# Patient Record
Sex: MALE | Race: WHITE | NOT HISPANIC OR LATINO | Employment: OTHER
[De-identification: names, ages, dates, MRNs, and addresses within clinical notes are randomized per-mention and may not be internally consistent; named-entity substitution may affect disease eponyms.]

---

## 2017-02-10 ENCOUNTER — RECORDS - HEALTHEAST (OUTPATIENT)
Dept: ADMINISTRATIVE | Facility: OTHER | Age: 57
End: 2017-02-10

## 2017-05-22 ENCOUNTER — RECORDS - HEALTHEAST (OUTPATIENT)
Dept: ADMINISTRATIVE | Facility: OTHER | Age: 57
End: 2017-05-22

## 2017-08-14 ENCOUNTER — RECORDS - HEALTHEAST (OUTPATIENT)
Dept: ADMINISTRATIVE | Facility: OTHER | Age: 57
End: 2017-08-14

## 2017-11-16 ENCOUNTER — RECORDS - HEALTHEAST (OUTPATIENT)
Dept: ADMINISTRATIVE | Facility: OTHER | Age: 57
End: 2017-11-16

## 2018-02-15 ENCOUNTER — RECORDS - HEALTHEAST (OUTPATIENT)
Dept: ADMINISTRATIVE | Facility: OTHER | Age: 58
End: 2018-02-15

## 2018-05-10 ENCOUNTER — RECORDS - HEALTHEAST (OUTPATIENT)
Dept: ADMINISTRATIVE | Facility: OTHER | Age: 58
End: 2018-05-10

## 2018-06-01 ENCOUNTER — RECORDS - HEALTHEAST (OUTPATIENT)
Dept: ADMINISTRATIVE | Facility: OTHER | Age: 58
End: 2018-06-01

## 2018-12-11 ENCOUNTER — RECORDS - HEALTHEAST (OUTPATIENT)
Dept: ADMINISTRATIVE | Facility: OTHER | Age: 58
End: 2018-12-11

## 2019-07-11 ENCOUNTER — RECORDS - HEALTHEAST (OUTPATIENT)
Dept: ADMINISTRATIVE | Facility: OTHER | Age: 59
End: 2019-07-11

## 2019-07-18 ENCOUNTER — RECORDS - HEALTHEAST (OUTPATIENT)
Dept: ADMINISTRATIVE | Facility: OTHER | Age: 59
End: 2019-07-18

## 2019-11-08 ENCOUNTER — COMMUNICATION - HEALTHEAST (OUTPATIENT)
Dept: TELEHEALTH | Facility: CLINIC | Age: 59
End: 2019-11-08

## 2019-11-08 ENCOUNTER — OFFICE VISIT - HEALTHEAST (OUTPATIENT)
Dept: INTERNAL MEDICINE | Facility: CLINIC | Age: 59
End: 2019-11-08

## 2019-11-08 DIAGNOSIS — Z85.46 HISTORY OF PROSTATE CANCER: ICD-10-CM

## 2019-11-08 DIAGNOSIS — Z00.00 HEALTHCARE MAINTENANCE: ICD-10-CM

## 2019-11-08 DIAGNOSIS — Z12.11 SCREEN FOR COLON CANCER: ICD-10-CM

## 2019-11-08 DIAGNOSIS — Z83.3 FAMILY HISTORY OF DIABETES MELLITUS IN FATHER: ICD-10-CM

## 2019-11-08 LAB
ALBUMIN SERPL-MCNC: 4.2 G/DL (ref 3.5–5)
ALP SERPL-CCNC: 90 U/L (ref 45–120)
ALT SERPL W P-5'-P-CCNC: 42 U/L (ref 0–45)
ANION GAP SERPL CALCULATED.3IONS-SCNC: 12 MMOL/L (ref 5–18)
AST SERPL W P-5'-P-CCNC: 36 U/L (ref 0–40)
BILIRUB SERPL-MCNC: 0.6 MG/DL (ref 0–1)
BUN SERPL-MCNC: 16 MG/DL (ref 8–22)
CALCIUM SERPL-MCNC: 9.6 MG/DL (ref 8.5–10.5)
CHLORIDE BLD-SCNC: 104 MMOL/L (ref 98–107)
CHOLEST SERPL-MCNC: 176 MG/DL
CO2 SERPL-SCNC: 23 MMOL/L (ref 22–31)
CREAT SERPL-MCNC: 1.1 MG/DL (ref 0.7–1.3)
ERYTHROCYTE [DISTWIDTH] IN BLOOD BY AUTOMATED COUNT: 12.2 % (ref 11–14.5)
FASTING STATUS PATIENT QL REPORTED: YES
GFR SERPL CREATININE-BSD FRML MDRD: >60 ML/MIN/1.73M2
GLUCOSE BLD-MCNC: 100 MG/DL (ref 70–125)
HCT VFR BLD AUTO: 51.2 % (ref 40–54)
HDLC SERPL-MCNC: 61 MG/DL
HGB BLD-MCNC: 17.4 G/DL (ref 14–18)
LDLC SERPL CALC-MCNC: 95 MG/DL
MCH RBC QN AUTO: 34.1 PG (ref 27–34)
MCHC RBC AUTO-ENTMCNC: 34 G/DL (ref 32–36)
MCV RBC AUTO: 101 FL (ref 80–100)
PLATELET # BLD AUTO: 202 THOU/UL (ref 140–440)
PMV BLD AUTO: 7.8 FL (ref 7–10)
POTASSIUM BLD-SCNC: 4.7 MMOL/L (ref 3.5–5)
PROT SERPL-MCNC: 7.1 G/DL (ref 6–8)
RBC # BLD AUTO: 5.09 MILL/UL (ref 4.4–6.2)
SODIUM SERPL-SCNC: 139 MMOL/L (ref 136–145)
TRIGL SERPL-MCNC: 99 MG/DL
WBC: 4.8 THOU/UL (ref 4–11)

## 2019-11-08 ASSESSMENT — MIFFLIN-ST. JEOR: SCORE: 1995.13

## 2019-11-11 ENCOUNTER — COMMUNICATION - HEALTHEAST (OUTPATIENT)
Dept: INTERNAL MEDICINE | Facility: CLINIC | Age: 59
End: 2019-11-11

## 2020-01-17 ENCOUNTER — RECORDS - HEALTHEAST (OUTPATIENT)
Dept: ADMINISTRATIVE | Facility: OTHER | Age: 60
End: 2020-01-17

## 2020-01-23 ENCOUNTER — RECORDS - HEALTHEAST (OUTPATIENT)
Dept: ADMINISTRATIVE | Facility: OTHER | Age: 60
End: 2020-01-23

## 2020-01-24 ENCOUNTER — RECORDS - HEALTHEAST (OUTPATIENT)
Dept: ADMINISTRATIVE | Facility: OTHER | Age: 60
End: 2020-01-24

## 2020-02-26 ENCOUNTER — HOSPITAL ENCOUNTER (OUTPATIENT)
Dept: CT IMAGING | Facility: HOSPITAL | Age: 60
Discharge: HOME | End: 2020-02-26

## 2020-02-26 DIAGNOSIS — D12.4 BENIGN NEOPLASM OF DESCENDING COLON: ICD-10-CM

## 2020-02-26 DIAGNOSIS — K57.92 DIVERTICULITIS: ICD-10-CM

## 2020-02-26 DIAGNOSIS — Z12.11 SCREENING FOR MALIGNANT NEOPLASM OF COLON: ICD-10-CM

## 2020-02-26 DIAGNOSIS — D12.3 BENIGN NEOPLASM OF TRANSVERSE COLON: ICD-10-CM

## 2020-02-26 DIAGNOSIS — K63.5 POLYP OF COLON, UNSPECIFIED PART OF COLON, UNSPECIFIED TYPE: ICD-10-CM

## 2020-02-26 DIAGNOSIS — K64.8 HEMORRHOIDS, INTERNAL: ICD-10-CM

## 2020-02-26 LAB
CREAT BLD-MCNC: 1.1 MG/DL (ref 0.7–1.3)
GFR SERPL CREATININE-BSD FRML MDRD: >60 ML/MIN/1.73M2

## 2020-06-22 ENCOUNTER — COMMUNICATION - HEALTHEAST (OUTPATIENT)
Dept: SCHEDULING | Facility: CLINIC | Age: 60
End: 2020-06-22

## 2020-08-31 ENCOUNTER — COMMUNICATION - HEALTHEAST (OUTPATIENT)
Dept: TELEHEALTH | Facility: CLINIC | Age: 60
End: 2020-08-31

## 2020-08-31 ENCOUNTER — OFFICE VISIT - HEALTHEAST (OUTPATIENT)
Dept: INTERNAL MEDICINE | Facility: CLINIC | Age: 60
End: 2020-08-31

## 2020-08-31 DIAGNOSIS — Z87.19 HISTORY OF FATTY INFILTRATION OF LIVER: ICD-10-CM

## 2020-08-31 DIAGNOSIS — R07.9 CHEST PAIN, UNSPECIFIED TYPE: ICD-10-CM

## 2020-08-31 DIAGNOSIS — Z85.46 HISTORY OF PROSTATE CANCER: ICD-10-CM

## 2020-08-31 LAB
ATRIAL RATE - MUSE: 84 BPM
DIASTOLIC BLOOD PRESSURE - MUSE: NORMAL
INTERPRETATION ECG - MUSE: NORMAL
P AXIS - MUSE: 61 DEGREES
PR INTERVAL - MUSE: 156 MS
QRS DURATION - MUSE: 86 MS
QT - MUSE: 426 MS
QTC - MUSE: 503 MS
R AXIS - MUSE: 89 DEGREES
SYSTOLIC BLOOD PRESSURE - MUSE: NORMAL
T AXIS - MUSE: 59 DEGREES
VENTRICULAR RATE- MUSE: 84 BPM

## 2020-08-31 RX ORDER — IBUPROFEN 200 MG
200 TABLET ORAL EVERY 6 HOURS PRN
Status: SHIPPED | COMMUNITY
Start: 2020-08-31

## 2020-08-31 RX ORDER — ASPIRIN 325 MG
325 TABLET ORAL EVERY 6 HOURS PRN
Status: SHIPPED | COMMUNITY
Start: 2020-08-31 | End: 2022-08-15

## 2021-06-03 VITALS
HEART RATE: 68 BPM | DIASTOLIC BLOOD PRESSURE: 83 MMHG | WEIGHT: 251.6 LBS | BODY MASS INDEX: 33.34 KG/M2 | HEIGHT: 73 IN | SYSTOLIC BLOOD PRESSURE: 128 MMHG

## 2021-06-03 NOTE — PATIENT INSTRUCTIONS - HE
Check blood pressure.  Make sure your blood pressure is running less than 135/85.  If it is running higher than that, contact me or follow-up in clinic to evaluate.    Continue regular walking exercise.  Goal for 20 minutes of exercise 3-4 times a week or more.    Reduce simple carbohydrates and work on weight loss.    You could consider the Shingrix shingles vaccine.  Check with your insurance.    I would recommend a flu shot this fall.    [place] [facility] will contact you to set up colonoscopy.    Routine physical follow-up in 1 year.

## 2021-06-03 NOTE — PROGRESS NOTES
[facility] clinic Follow Up Note    Zachary Barajas   59 y.o. male    Date of Visit: 11/8/2019    Chief Complaint   Patient presents with     Annual Exam     Subjective  Zachary, brother-in-law of Eddie Beckham, he is here for health maintenance physical exam.  I have not seen him since 2015, just before he was diagnosed with prostate cancer and had a prostatectomy.    December 2015 pathology with a 3+3 prostate cancer.  Negative margins negative nodes.  I did review the urology note from July 2019 with negative exam and PSA less than 0.1.  Minimal incontinence just 1 pad a day.  Stable nocturia.    He did have a family history of prostate cancer with his father.    He also has a family history of diabetes with his father.  No family history of coronary disease except for grandfather.    Patient's blood sugars have been normal.  In 2015 his LDL 80 and HDL 79 without medication.  Blood sugar was normal at that time.    He is moderately overweight.  Just rare beer.  He does not drink pop.  He does eat some snack food and large meals at times.  He generally eats a healthy diet.    His blood pressure has been normal in the past.  He does not check blood pressure on his own.  He denies any recent salty foods.    He does take occasional ibuprofen but not regularly.  He has some mild knee DJD achiness when doing stairs or on his feet more.  But no flare currently.    He has an old left ankle injury from basketball, with chronic ankle pain.    History of chronic sinusitis but no flare currently.  Not needing Flonase or Claritin currently.    He is never smoked.  No new cough.    No chest pain or chest pressure or palpitations.    He does walk about 3 times a week in his neighborhood about 20 minutes.  Good exertional ability.  No falls.    Bowels are normal and no abdominal pain.  Occasional bright red blood per rectum but not within the last year.  December 2004 colonoscopy negative.  No family history of colon  "cancer.    No headache complaints.  No vision changes.  Did see the ophthalmologist earlier this year and no problems.  No mouth sores or swallowing difficulty.    He has not noticed any change in his moles.  No family history of melanoma.    Sleeps adequately at night without sniffing and daytime sleepiness.    PMHx:    Past Medical History:   Diagnosis Date     Cancer (H)     prostate     PSHx:    Past Surgical History:   Procedure Laterality Date     oral extractions      in childhood     MO LAP,PROSTATECTOMY,RADICAL,W/NERVE SPARE,INCL ROBOTIC Bilateral 12/22/2015    Procedure: DAVINCI RADICAL RETROPUBIC PROSTATECTOMY BILATERAL PELVIC LYMPH NODE DISSECTION;  Surgeon: Junaid Carcamo MD;  Location: [facility] [place];  Service: Urology     Immunizations:   Immunization History   Administered Date(s) Administered     Td,adult,historic,unspecified 12/02/2004     Tdap 07/16/2015       ROS A comprehensive review of systems was performed and was otherwise negative    Medications, allergies, and problem list were reviewed and updated    Exam  /88 (Patient Site: Right Arm, Patient Position: Sitting, Cuff Size: Adult Large)   Pulse 68   Ht 6' 1\" (1.854 m)   Wt (!) 251 lb 9.6 oz (114.1 kg)   BMI 33.19 kg/m    Alert and oriented x3 with good mood and affect.  Pupils and irises equal and reactive.  Extra ocular muscles intact.  No jaundice or conjunctivitis.  External ears and nose exam is normal with tympanic membranes normal.  Pharynx is not crowded.  Teeth in good condition.  No cervical or supraclavicular adenopathy.  No JVD and no carotid bruits.  No thyromegaly or nodularity.  Lungs are clear to auscultation with normal respiratory excursion.  Heart is regular with no murmur rub or gallop.  He has +1 pedal pulses.  No ankle edema but moderate varicose veins.  Feet in good condition.  Abdomen is mildly overweight.  No hepatospleno megaly or pulsatile mass.  Abdomen nontender.  No  exam as has been " done by his urologist.  Skin exam shows multiple well demarcated uniform color moles.  Gait normal.  Muscular skeletal exam otherwise normal.    Blood pressure rechecked by me was 128/83    Assessment/Plan  1. Healthcare maintenance  Main emphasis is on reducing progression to diabetes.  He otherwise appears relatively low cardiovascular risk profile.  He is never smoked and has excellent cholesterol levels with high HDL.    Blood pressure has been normal in the past, but I did ask him to check blood pressure on his own and get back to me if it is running high.    I did emphasize the importance of losing weight and improving regular exercise.    I discussed lower carbohydrate diet.  - Comprehensive Metabolic Panel  - HM2(CBC w/o Differential)  - Lipid Cascade    2. Screen for colon cancer  Proceed with colonoscopy  - Ambulatory referral for Colonoscopy    3. History of prostate cancer  I would not anticipate recurrence.  Low-grade prostate cancer with negative margins and nodes.  Follow-up with urology as planned.  After he completes 5 years of surveillance with urology, he could consider simply checking PSAs yearly here.    4. Family history of diabetes mellitus in father  Diet and exercise plan discussed    He declined the flu shot but I encouraged him to get it.  See patient instructions.    Return in about 1 year (around 11/8/2020) for Annual physical.   Patient Instructions   Check blood pressure.  Make sure your blood pressure is running less than 135/85.  If it is running higher than that, contact me or follow-up in clinic to evaluate.    Continue regular walking exercise.  Goal for 20 minutes of exercise 3-4 times a week or more.    Reduce simple carbohydrates and work on weight loss.    You could consider the Shingrix shingles vaccine.  Check with your insurance.    I would recommend a flu shot this fall.    [place] [facility] will contact you to set up colonoscopy.    Routine physical follow-up in  1 year.    Lino Castro MD        No current outpatient medications on file.     No current facility-administered medications for this visit.      No Known Allergies  Social History     Tobacco Use     Smoking status: Never Smoker     Smokeless tobacco: Never Used   Substance Use Topics     Alcohol use: Yes     Alcohol/week: 4.0 - 6.0 standard drinks     Types: 4 - 6 Standard drinks or equivalent per week     Comment: occasional     Drug use: No

## 2021-06-04 VITALS
WEIGHT: 243.5 LBS | HEART RATE: 80 BPM | DIASTOLIC BLOOD PRESSURE: 84 MMHG | SYSTOLIC BLOOD PRESSURE: 122 MMHG | TEMPERATURE: 98.9 F | BODY MASS INDEX: 32.13 KG/M2

## 2021-06-09 NOTE — TELEPHONE ENCOUNTER
Wife informed. Offered an appt today. Wife stated pt is doing well but will have him let us know if he changes his mind. Wife stated that if pt has chest tightness or shortness of breath he will go to the ER asap.

## 2021-06-09 NOTE — TELEPHONE ENCOUNTER
"Patient's wife Alivia calls on his behalf today. Patient came on the line briefly to give verbal consent to speak with Alivia. He did not sound short of breath or in distress at that time. Breathing comfortably, states he is currently working from home.      Alivia states that patient has some chest tightness and shortness of breath today. He's had the intermittent shortness of breath for about 2 weeks. She states \"he breathes so hard he sounds like darth mily.\" Today the shortness of breath is a little bit more than usual. The new concerning symptom today is the consistent chest tightness that radiates to  the shoulder. Alivia states \"it's not an emergency or anything. But I'd like him to just come in for an appointment to be seen.\" Denies numbness/tingling, denies dizziness or fevers.    I advised Alivia that the symptoms patient is having right now are in fact emergent symptoms. Any kind of chest pain/tightness that lasts longer than a few minutes is serious. Especially if patient feels the pain radiating to the shoulder and has some shortness of breath. These are very serious symptoms that warrant immediate attention. We cannot safely rule out a heart attack or other cardiac symptom over the phone. Patient should be taken into the ED immediately. Alivia verbalizes understanding of the disposition but declines at this time. She states she just wants an office visit. I told her I can try to get an office visit today but even that might be tough as it's already 3pm. Transferred to scheduling who confirmed no office visits today. Checked to see if patient would be willing to be seen in walk-in urgent care but declines. States they would like to watch his symptoms overnight and will call back tomorrow to make an appointment if still concerned. She states she is prepared to call 911/EMS if his symptoms become worse.     I will forward this to patient's PCP or covering provider today to make sure they are aware of this " situation.    Lakeshia Steele RN    Reason for Disposition    Chest pain lasting longer than 5 minutes and ANY of the following:* Over 50 years old* Over 30 years old and at least one cardiac risk factor (i.e., high blood pressure, diabetes, high cholesterol, obesity, smoker or strong family history of heart disease)* Pain is crushing, pressure-like, or heavy * Took nitroglycerin and chest pain was not relieved* History of heart disease (i.e., angina, heart attack, bypass surgery, angioplasty, CHF)    Additional Information    Negative: Followed an injury to chest    Negative: Sounds like a life-threatening emergency to the triager    Negative: Visible sweat on face or sweat dripping down face    Negative: Passed out (i.e., fainted, collapsed and was not responding)    Negative: SEVERE difficulty breathing (e.g., struggling for each breath, speaks in single words)    Chest pain lasting longer than 5 minutes    Protocols used: CHEST PAIN-A-OH

## 2021-06-09 NOTE — TELEPHONE ENCOUNTER
This does not appear to have been forwarded to covering physician last week.  I was out of office last week.     Patient needs immediate assessment, but apparently refused on June 22. If patient continues to have symptoms of chest tightness or shortness of breath, he should go to ER right away.  Make sure he also gets a clinic appointment with available provider on Monday.

## 2021-06-11 NOTE — PROGRESS NOTES
[facility] clinic Follow Up Note    Zachary Barajas   59 y.o. male    Date of Visit: 8/31/2020    Chief Complaint   Patient presents with     Follow-up     Blood pressure checkup     Subjective  Zachary is here to evaluate 2 episodes of chest pain that occurred in March and then in June.    In March he was working very hard to get a large cat climbing equipment put up, and thinks he may have strained a muscle.  He did not feel that until the next day when he got up and felt a linear tight muscle feeling, like a pulled muscle, underneath his left pectoral area at approximately T6 on the left.  There is no rash or vesicular eruption.  He took some ibuprofen or aspirin and it lasted about 30 minutes with resolution.  He proceeded to continue his normal activity.    He denies any shortness of breath or diaphoresis or nausea or abdominal pain at that time.  Pain began at rest.  Pain was not exacerbated by activity.  No new cough or fever.    Patient had a similar episode in June, also at rest after sitting in a chair on his computer for prolonged period of time.  He is now got a better more comfortable chair.  That occurred while sitting.  He stated he got up and stretched and moved around and within 30 minutes that also resolved in June.    He is not had any recurrence of the chest pain.    He is active with walking in the neighborhood about 3 times a week.  Plays 18 holes of golf.  He was shoveling dirt and moving some stones last week but did not have any of the above symptoms with that.    He is moderately overweight.  He has a family history of diabetes with his father but his blood sugars have been normal.    Patient has never smoked.    Blood pressure has been normal, not on medication.  Some borderline blood pressures last year but normal today.    November 2019 LDL 95 and HDL 61 without medication.  No family history of coronary artery disease.    He denies he is having heartburn.    No fever or  swallowing problems.  No throat pain.    Past history of prostatectomy in 2015 for 3+3 prostate cancer with negative margins.  I did review labs from January 2020 with a PSA less than 0.1.    I did review an abdominal CT scan report that was done by gastroenterology in February of this year.  Fatty liver described.  Left kidney cyst but otherwise negative.    His colonoscopy did show 3 tubular adenomas January 2020.  An ileal biopsy was negative.  No family history of colon cancer.    Bowels are normal and no abdominal pain complaints.    He denies flare of his sinusitis currently.    PMHx:    Past Medical History:   Diagnosis Date     Cancer (H)     prostate     PSHx:    Past Surgical History:   Procedure Laterality Date     oral extractions      in childhood     VT LAP,PROSTATECTOMY,RADICAL,W/NERVE SPARE,INCL ROBOTIC Bilateral 12/22/2015    Procedure: DAVINCI RADICAL RETROPUBIC PROSTATECTOMY BILATERAL PELVIC LYMPH NODE DISSECTION;  Surgeon: Junaid Carcamo MD;  Location: [facility] [place];  Service: Urology     Immunizations:   Immunization History   Administered Date(s) Administered     Td,adult,historic,unspecified 12/02/2004     Tdap 07/16/2015       ROS A comprehensive review of systems was performed and was otherwise negative    Medications, allergies, and problem list were reviewed and updated    Exam  /84 (Patient Site: Right Arm, Patient Position: Sitting, Cuff Size: Adult Large)   Pulse 80   Temp 98.9  F (37.2  C) (Oral)   Wt (!) 243 lb 8 oz (110.5 kg)   BMI 32.13 kg/m    Alert and oriented.  No jaundice.  No cervical or supraclavicular or axillary adenopathy.  No JVD.  No jaundice.  Lungs are clear to auscultation with good respiratory excursion.  Chest wall appears normal.  He is moderately overweight.  There is no tenderness or crepitus to the chest wall.  No deformity of the chest wall to inspection and palpation.  Heart is regular with no murmur rub or gallop.  Abdomen is nontender  no hepatomegaly.  No ankle edema.    ECG personally read by me was normal sinus rhythm.  R wave in V1 but similar to 2015 EKG, and read as normal EKG, except for questionable QTC measurement.  I will await final read by cardiology.  Patient does not take any medication.    Assessment/Plan  1. Chest pain, unspecified type  Consistent with left intercostal muscle chest wall pain, likely from straining as he put up some cat climbing equipment.    He had recurrent episode in June that sounded associated with leaning forward in an uncomfortable chair working on the computer for extended period of time.    He has not had recurrent chest pain with fairly good exertional levels last week.    Patient was told to get reevaluated if any new or recurrent pain.  I did discuss option for stress test with patient today, but he has adequate exertional ability, and above chest pain episode was more suggestive of chest wall and nonexertional.    Relatively low cardiovascular risk with excellent cholesterol, normal blood pressure and no family history of coronary disease.    Follow-up for physical exam in November  - Electrocardiogram Perform and Read    2. History of prostate cancer  No evidence of recurrence.  PSA was 0 in January.  I will plan to recheck PSA at his physical exam, and would plan simply yearly PSA checks from now on.    3. History of fatty infiltration of liver  Work on diet and exercise.  Check liver tests and additional lab work this fall at his physical exam.    Chronic sinusitis currently controlled.  He could start Flonase and Claritin if needed.    History of colon polyp, 3-year follow-up colonoscopy will be due January 2023    Return in about 2 months (around 11/11/2020) for Annual physical.   Patient Instructions   Get reevaluated if you do develop new chest pain, especially if you develop chest pain while walking or active, or if you get increasing shortness of breath with chest pain.    I do suspect it was  a pulled intercostal muscle in your chest wall that caused the pain previously, however.    Schedule a physical exam with fasting labs in November or December.  I will plan to check a PSA at that time.        Lino Castro MD        Current Outpatient Medications   Medication Sig Dispense Refill     aspirin 325 MG tablet Take 325 mg by mouth every 6 (six) hours as needed for pain.       ibuprofen (ADVIL,MOTRIN) 200 MG tablet Take 200 mg by mouth every 6 (six) hours as needed for pain.       No current facility-administered medications for this visit.      No Known Allergies  Social History     Tobacco Use     Smoking status: Never Smoker     Smokeless tobacco: Never Used   Substance Use Topics     Alcohol use: Yes     Alcohol/week: 4.0 - 6.0 standard drinks     Types: 4 - 6 Standard drinks or equivalent per week     Comment: occasional     Drug use: No

## 2021-06-11 NOTE — PATIENT INSTRUCTIONS - HE
Get reevaluated if you do develop new chest pain, especially if you develop chest pain while walking or active, or if you get increasing shortness of breath with chest pain.    I do suspect it was a pulled intercostal muscle in your chest wall that caused the pain previously, however.    Schedule a physical exam with fasting labs in November or December.  I will plan to check a PSA at that time.

## 2021-06-19 NOTE — LETTER
Letter by Lino Castro MD at      Author: Lino Castro MD Service: -- Author Type: --    Filed:  Encounter Date: 11/11/2019 Status: Signed         Zachary Barajas  [address] [address]             November 11, 2019         Dear Mr. Barajas,    Below are the results from your recent visit:    Resulted Orders   Comprehensive Metabolic Panel   Result Value Ref Range    Sodium 139 136 - 145 mmol/L    Potassium 4.7 3.5 - 5.0 mmol/L    Chloride 104 98 - 107 mmol/L    CO2 23 22 - 31 mmol/L    Anion Gap, Calculation 12 5 - 18 mmol/L    Glucose 100 70 - 125 mg/dL    BUN 16 8 - 22 mg/dL    Creatinine 1.10 0.70 - 1.30 mg/dL    GFR MDRD Af Amer >60 >60 mL/min/1.73m2    GFR MDRD Non Af Amer >60 >60 mL/min/1.73m2    Bilirubin, Total 0.6 0.0 - 1.0 mg/dL    Calcium 9.6 8.5 - 10.5 mg/dL    Protein, Total 7.1 6.0 - 8.0 g/dL    Albumin 4.2 3.5 - 5.0 g/dL    Alkaline Phosphatase 90 45 - 120 U/L    AST 36 0 - 40 U/L    ALT 42 0 - 45 U/L    Narrative    Fasting Glucose reference range is 70-99 mg/dL per  American Diabetes Association (ADA) guidelines.   HM2(CBC w/o Differential)   Result Value Ref Range    WBC 4.8 4.0 - 11.0 thou/uL    RBC 5.09 4.40 - 6.20 mill/uL    Hemoglobin 17.4 14.0 - 18.0 g/dL    Hematocrit 51.2 40.0 - 54.0 %     (H) 80 - 100 fL    MCH 34.1 (H) 27.0 - 34.0 pg    MCHC 34.0 32.0 - 36.0 g/dL    RDW 12.2 11.0 - 14.5 %    Platelets 202 140 - 440 thou/uL    MPV 7.8 7.0 - 10.0 fL   Lipid Cascade   Result Value Ref Range    Cholesterol 176 <=199 mg/dL    Triglycerides 99 <=149 mg/dL    HDL Cholesterol 61 >=40 mg/dL    LDL Calculated 95 <=129 mg/dL    Patient Fasting > 8hrs? Yes        Kidney and liver tests are normal.  Blood sugar is normal.    Hemoglobin and blood counts are normal.    Excellent cholesterol levels.    No change in treatment plan.    Please call with questions or contact us using MyChart.    Sincerely,        Electronically signed by Lino Castro MD

## 2022-08-15 ENCOUNTER — OFFICE VISIT (OUTPATIENT)
Dept: INTERNAL MEDICINE | Facility: CLINIC | Age: 62
End: 2022-08-15
Payer: COMMERCIAL

## 2022-08-15 VITALS
BODY MASS INDEX: 33.89 KG/M2 | OXYGEN SATURATION: 96 % | HEART RATE: 72 BPM | SYSTOLIC BLOOD PRESSURE: 136 MMHG | WEIGHT: 255.7 LBS | HEIGHT: 73 IN | DIASTOLIC BLOOD PRESSURE: 82 MMHG

## 2022-08-15 DIAGNOSIS — Z11.4 SCREENING FOR HIV (HUMAN IMMUNODEFICIENCY VIRUS): ICD-10-CM

## 2022-08-15 DIAGNOSIS — Z00.00 HEALTH MAINTENANCE EXAMINATION: Primary | ICD-10-CM

## 2022-08-15 DIAGNOSIS — Z11.59 NEED FOR HEPATITIS C SCREENING TEST: ICD-10-CM

## 2022-08-15 DIAGNOSIS — Z86.0100 HISTORY OF COLONIC POLYPS: ICD-10-CM

## 2022-08-15 DIAGNOSIS — Z85.46 HISTORY OF PROSTATE CANCER: ICD-10-CM

## 2022-08-15 LAB
ALBUMIN SERPL BCG-MCNC: 4.5 G/DL (ref 3.5–5.2)
ALP SERPL-CCNC: 75 U/L (ref 40–129)
ALT SERPL W P-5'-P-CCNC: 24 U/L (ref 10–50)
ANION GAP SERPL CALCULATED.3IONS-SCNC: 11 MMOL/L (ref 7–15)
AST SERPL W P-5'-P-CCNC: 29 U/L (ref 10–50)
BILIRUB SERPL-MCNC: 0.4 MG/DL
BUN SERPL-MCNC: 16.6 MG/DL (ref 8–23)
CALCIUM SERPL-MCNC: 9.3 MG/DL (ref 8.8–10.2)
CHLORIDE SERPL-SCNC: 103 MMOL/L (ref 98–107)
CREAT SERPL-MCNC: 1.09 MG/DL (ref 0.67–1.17)
DEPRECATED HCO3 PLAS-SCNC: 24 MMOL/L (ref 22–29)
ERYTHROCYTE [DISTWIDTH] IN BLOOD BY AUTOMATED COUNT: 13.2 % (ref 10–15)
GFR SERPL CREATININE-BSD FRML MDRD: 76 ML/MIN/1.73M2
GLUCOSE SERPL-MCNC: 100 MG/DL (ref 70–99)
HCT VFR BLD AUTO: 46 % (ref 40–53)
HGB BLD-MCNC: 15.9 G/DL (ref 13.3–17.7)
MCH RBC QN AUTO: 32.5 PG (ref 26.5–33)
MCHC RBC AUTO-ENTMCNC: 34.6 G/DL (ref 31.5–36.5)
MCV RBC AUTO: 94 FL (ref 78–100)
PLATELET # BLD AUTO: 190 10E3/UL (ref 150–450)
POTASSIUM SERPL-SCNC: 4.1 MMOL/L (ref 3.4–5.3)
PROT SERPL-MCNC: 7 G/DL (ref 6.4–8.3)
PSA SERPL-MCNC: 0.01 NG/ML (ref 0–4.5)
RBC # BLD AUTO: 4.89 10E6/UL (ref 4.4–5.9)
SODIUM SERPL-SCNC: 138 MMOL/L (ref 136–145)
WBC # BLD AUTO: 5.2 10E3/UL (ref 4–11)

## 2022-08-15 PROCEDURE — 85027 COMPLETE CBC AUTOMATED: CPT | Performed by: INTERNAL MEDICINE

## 2022-08-15 PROCEDURE — 86803 HEPATITIS C AB TEST: CPT | Performed by: INTERNAL MEDICINE

## 2022-08-15 PROCEDURE — 80053 COMPREHEN METABOLIC PANEL: CPT | Performed by: INTERNAL MEDICINE

## 2022-08-15 PROCEDURE — 87389 HIV-1 AG W/HIV-1&-2 AB AG IA: CPT | Performed by: INTERNAL MEDICINE

## 2022-08-15 PROCEDURE — 36415 COLL VENOUS BLD VENIPUNCTURE: CPT | Performed by: INTERNAL MEDICINE

## 2022-08-15 PROCEDURE — 99396 PREV VISIT EST AGE 40-64: CPT | Performed by: INTERNAL MEDICINE

## 2022-08-15 PROCEDURE — 84153 ASSAY OF PSA TOTAL: CPT | Performed by: INTERNAL MEDICINE

## 2022-08-15 ASSESSMENT — ENCOUNTER SYMPTOMS
PARESTHESIAS: 0
ABDOMINAL PAIN: 0
HEADACHES: 0
NAUSEA: 0
CONSTIPATION: 0
WEAKNESS: 0
HEMATOCHEZIA: 0
MYALGIAS: 0
HEARTBURN: 0
FEVER: 0
HEMATURIA: 0
FREQUENCY: 0
DIZZINESS: 0
JOINT SWELLING: 1
PALPITATIONS: 0
SORE THROAT: 0
DIARRHEA: 0
SHORTNESS OF BREATH: 0
COUGH: 0
NERVOUS/ANXIOUS: 0
CHILLS: 0
DYSURIA: 0
EYE PAIN: 0
ARTHRALGIAS: 0

## 2022-08-15 NOTE — PATIENT INSTRUCTIONS
I do recommend the flu shot this fall.    The new COVID-vaccine should be coming out this fall, I would recommend you get that.    There is a new antiviral medication for COVID now.  If you do think you have COVID test right away.  If you test positive for COVID, call right away, as you have 5 days from onset of symptoms in order to get treatment.    Your colonoscopy is due this January, 2023.  You should receive contact to set up the colonoscopy.  Contact me if you do not receive contact to schedule that.    You can consider the Shingrix shingles vaccine, that can be obtained through local pharmacy.    Reduce simple carbohydrates and starchy foods in diet.  Goal for 20 pound weight loss.  20 minutes of aerobic type exercise such as walking is recommended at least 4 times a week.

## 2022-08-15 NOTE — PROGRESS NOTES
SUBJECTIVE:   CC: Quang Barajas is an 61 year old male who presents for preventative health visit.   Living independently.  He does go walking intermittently.  He plays golf once or twice a week.  Stable exertional ability.  Denies any increasing shortness of breath, chest pain, palpitations or worsening edema.    His blood pressures been normal without medication.    No family history of coronary disease.  Does have a family history of diabetes with father.  He is moderately overweight.    No daytime sleepiness or sleep apnea symptoms in the past.  His blood pressure has been normal previously.    November 2019 LDL was 95 and HDL 61, not on medication.    Normal liver test in 2019.  No jaundice or right upper quadrant pain.    No polyuria or polydipsia.    He did have a prostatectomy for 3+3 cancer in 2015 without recurrence.  He no longer sees urology.  No new urinary symptoms.    Bowels are normal on regular.  No abdominal pain complaints.  No blood in stool.    January 2020 colonoscopy with 4 polyps, 3-year follow-up plan.    No flare of his sinusitis, just mild allergy symptoms.  No new cough or shortness of breath.    Old left ankle injury from basketball, chronic pain but stable.    Brother-in-law of Eddie Beckham    No headache complaints.    Saw ophthalmology approximately 6 months ago with normal exam.    Patient has been advised of split billing requirements and indicates understanding: Yes  Healthy Habits:     Getting at least 3 servings of Calcium per day:  Yes    Bi-annual eye exam:  Yes    Dental care twice a year:  Yes    Sleep apnea or symptoms of sleep apnea:  None    Diet:  Regular (no restrictions)    Frequency of exercise:  2-3 days/week    Duration of exercise:  15-30 minutes    Taking medications regularly:  Yes    Medication side effects:  None    PHQ-2 Total Score: 0    Additional concerns today:  No        Today's PHQ-2 Score:   PHQ-2 ( 1999 [employer]) 8/15/2022   Q1: Little interest or  pleasure in doing things 0   Q2: Feeling down, depressed or hopeless 0   PHQ-2 Score 0   Q1: Little interest or pleasure in doing things Not at all   Q2: Feeling down, depressed or hopeless Not at all   PHQ-2 Score 0       Abuse: Current or Past(Physical, Sexual or Emotional)- No  Do you feel safe in your environment? Yes    Have you ever done Advance Care Planning? (For example, a Health Directive, POLST, or a discussion with a medical provider or your loved ones about your wishes): Yes, patient states has an Advance Care Planning document and will bring a copy to the clinic.    Social History     Tobacco Use     Smoking status: Never Smoker     Smokeless tobacco: Never Used   Substance Use Topics     Alcohol use: Yes     Alcohol/week: 4.0 - 6.0 standard drinks     Comment: Alcoholic Drinks/day: occasional         Alcohol Use 8/15/2022   Prescreen: >3 drinks/day or >7 drinks/week? No       Last PSA:   Prostate Specific Antigen Screen   Date Value Ref Range Status   07/16/2015 4.3 (H) 0.0 - 3.5 ng/mL Final       Reviewed orders with patient. Reviewed health maintenance and updated orders accordingly - Yes  Lab work is in process  Patient Active Problem List   Diagnosis     Allergic Rhinitis     Obesity     Tendonitis Of The Left Achilles Tendon     Serology Prostate-specific Antigen (PSA) Elevated     Prostate cancer (H)     Past Surgical History:   Procedure Laterality Date     OTHER SURGICAL HISTORY      oral extractionsin childhood     VA LAP,PROSTATECTOMY,RADICAL,W/NERVE SPARE,INCL ROBOTIC Bilateral 12/22/2015    Procedure: DAVINCI RADICAL RETROPUBIC PROSTATECTOMY BILATERAL PELVIC LYMPH NODE DISSECTION;  Surgeon: Junaid Carcamo MD;  Location: [facility] - [place];  Service: Urology       Social History     Tobacco Use     Smoking status: Never Smoker     Smokeless tobacco: Never Used   Substance Use Topics     Alcohol use: Yes     Alcohol/week: 4.0 - 6.0 standard drinks     Comment: Alcoholic Drinks/day:  "occasional     No family history on file.      Current Outpatient Medications   Medication Sig Dispense Refill     ibuprofen (ADVIL,MOTRIN) 200 MG tablet [IBUPROFEN (ADVIL,MOTRIN) 200 MG TABLET] Take 200 mg by mouth every 6 (six) hours as needed for pain.       Allergies   Allergen Reactions     Seasonal Allergies        Reviewed and updated as needed this visit by clinical staff   Tobacco  Allergies  Meds                Reviewed and updated as needed this visit by Provider                   Past Medical History:   Diagnosis Date     Cancer (H)     prostate      Past Surgical History:   Procedure Laterality Date     OTHER SURGICAL HISTORY      oral extractionsin childhood     VA LAP,PROSTATECTOMY,RADICAL,W/NERVE SPARE,INCL ROBOTIC Bilateral 12/22/2015    Procedure: DAVINCI RADICAL RETROPUBIC PROSTATECTOMY BILATERAL PELVIC LYMPH NODE DISSECTION;  Surgeon: Junaid Carcamo MD;  [place] [facility] [place];  Service: Urology       Review of Systems   Constitutional: Negative for chills and fever.   HENT: Negative for congestion, ear pain, hearing loss and sore throat.    Eyes: Negative for pain and visual disturbance.   Respiratory: Negative for cough and shortness of breath.    Cardiovascular: Positive for peripheral edema. Negative for chest pain and palpitations.   Gastrointestinal: Negative for abdominal pain, constipation, diarrhea, heartburn, hematochezia and nausea.   Genitourinary: Negative for dysuria, frequency, genital sores, hematuria, impotence, penile discharge and urgency.   Musculoskeletal: Positive for joint swelling. Negative for arthralgias and myalgias.   Skin: Negative for rash.   Neurological: Negative for dizziness, weakness, headaches and paresthesias.   Psychiatric/Behavioral: Negative for mood changes. The patient is not nervous/anxious.          OBJECTIVE:   /82 (BP Location: Right arm, Patient Position: Sitting, Cuff Size: Adult Regular)   Pulse 72   Ht 1.842 m (6' 0.5\")  "  Wt 116 kg (255 lb 11.2 oz)   SpO2 96%   BMI 34.20 kg/m      Physical Exam  Moderately overweight abdominal obesity.  Alert and oriented x3 with normal cognition.  Normal mobility.  Pupils and irises equal and reactive.  Extraocular muscles intact.  No jaundice or conjunctivitis.  External ears and nose exam is normal.  Tympanic membranes are normal.  No cervical or supraclavicular adenopathy.  No JVD and no carotid bruits.  No axillary adenopathy.  No thyromegaly or nodularity.  Lungs are clear to auscultation with good respiratory excursion.  Heart is regular without murmur rub or gallop.  +1 pedal pulses.  Trace ankle edema bilaterally with some moderate varicose veins.  No preulcerative calluses on the feet.  Abdomen is moderately overweight but nontender.  No hepatosplenomegaly.  Liver edge is palpable and normal.  No pulsatile abdominal mass.  He declined  exam.  Skin exam without suspicious skin lesions    ASSESSMENT/PLAN:   (Z00.00) Health maintenance examination  (primary encounter diagnosis)  Comment: Patient's main health maintenance issue is family history of diabetes and his obesity.  I did discuss need for weight loss and reducing simple carbohydrates in diet and increase regular activity as able.    He is otherwise low cardiovascular risk profile, and excellent cholesterol levels previously.  He is not fasting today and will do fasting cholesterol next year.  Plan: Comprehensive metabolic panel        Routine eye exam every 1 to 2 years, was seen 6 months ago.    We discussed the COVID booster, he declined at this time though is planning to get the new booster this fall.  Flu shot this fall.    We discussed Shingrix as an option    (Z11.4) Screening for HIV (human immunodeficiency virus)  Comment: Not suspected, patient agreed to screening  Plan: HIV Antigen Antibody Combo            (Z11.59) Need for hepatitis C screening test  Comment: Not suspected, patient agreed to screening  Plan: Hepatitis  "C Screen Reflex to HCV RNA Quant and         Genotype            (Z86.010) History of colonic polyps  Comment: Patient was told that his colonoscopy is due January 2023, 3-year follow-up  Plan: CBC with platelets            (Z85.46) History of prostate cancer  Comment: Prostatectomy, no recurrence of a 3+3 cancer in 2015  Plan: PSA tumor marker        Yearly PSA check monitoring, no longer seeing urology          COUNSELING:   Reviewed preventive health counseling, as reflected in patient instructions       Regular exercise       Healthy diet/nutrition       Vision screening       Colorectal cancer screening    Estimated body mass index is 34.2 kg/m  as calculated from the following:    Height as of this encounter: 1.842 m (6' 0.5\").    Weight as of this encounter: 116 kg (255 lb 11.2 oz).     Weight management plan: Discussed healthy diet and exercise guidelines    He reports that he has never smoked. He has never used smokeless tobacco.      Counseling Resources:  ATP IV Guidelines  Pooled Cohorts Equation Calculator  FRAX Risk Assessment  ICSI Preventive Guidelines  Dietary Guidelines for Americans, 2010  USDA's MyPlate  ASA Prophylaxis  Lung CA Screening    Lino Castro MD  [facility]  "

## 2022-08-16 LAB
HCV AB SERPL QL IA: NONREACTIVE
HIV 1+2 AB+HIV1 P24 AG SERPL QL IA: NONREACTIVE

## 2023-01-08 ENCOUNTER — HEALTH MAINTENANCE LETTER (OUTPATIENT)
Age: 63
End: 2023-01-08

## 2023-02-10 ENCOUNTER — TRANSFERRED RECORDS (OUTPATIENT)
Dept: HEALTH INFORMATION MANAGEMENT | Facility: CLINIC | Age: 63
End: 2023-02-10
Payer: COMMERCIAL

## 2023-08-16 ENCOUNTER — OFFICE VISIT (OUTPATIENT)
Dept: INTERNAL MEDICINE | Facility: CLINIC | Age: 63
End: 2023-08-16
Payer: COMMERCIAL

## 2023-08-16 VITALS
BODY MASS INDEX: 34.72 KG/M2 | WEIGHT: 262 LBS | HEART RATE: 59 BPM | RESPIRATION RATE: 18 BRPM | TEMPERATURE: 98.6 F | OXYGEN SATURATION: 96 % | DIASTOLIC BLOOD PRESSURE: 80 MMHG | SYSTOLIC BLOOD PRESSURE: 120 MMHG | HEIGHT: 73 IN

## 2023-08-16 DIAGNOSIS — Z86.0100 HISTORY OF COLONIC POLYPS: ICD-10-CM

## 2023-08-16 DIAGNOSIS — Z85.46 HISTORY OF PROSTATE CANCER: ICD-10-CM

## 2023-08-16 DIAGNOSIS — Z13.220 SCREENING FOR HYPERLIPIDEMIA: ICD-10-CM

## 2023-08-16 DIAGNOSIS — R73.9 HYPERGLYCEMIA: ICD-10-CM

## 2023-08-16 DIAGNOSIS — Z00.00 HEALTHCARE MAINTENANCE: Primary | ICD-10-CM

## 2023-08-16 LAB
ALBUMIN SERPL BCG-MCNC: 4.7 G/DL (ref 3.5–5.2)
ALP SERPL-CCNC: 83 U/L (ref 40–129)
ALT SERPL W P-5'-P-CCNC: 36 U/L (ref 0–70)
ANION GAP SERPL CALCULATED.3IONS-SCNC: 14 MMOL/L (ref 7–15)
AST SERPL W P-5'-P-CCNC: 40 U/L (ref 0–45)
BILIRUB SERPL-MCNC: 0.5 MG/DL
BUN SERPL-MCNC: 15.7 MG/DL (ref 8–23)
CALCIUM SERPL-MCNC: 9.7 MG/DL (ref 8.8–10.2)
CHLORIDE SERPL-SCNC: 103 MMOL/L (ref 98–107)
CHOLEST SERPL-MCNC: 183 MG/DL
CREAT SERPL-MCNC: 0.94 MG/DL (ref 0.67–1.17)
DEPRECATED HCO3 PLAS-SCNC: 20 MMOL/L (ref 22–29)
ERYTHROCYTE [DISTWIDTH] IN BLOOD BY AUTOMATED COUNT: 12.8 % (ref 10–15)
GFR SERPL CREATININE-BSD FRML MDRD: >90 ML/MIN/1.73M2
GLUCOSE SERPL-MCNC: 106 MG/DL (ref 70–99)
HBA1C MFR BLD: 5.5 % (ref 0–5.6)
HCT VFR BLD AUTO: 50.2 % (ref 40–53)
HDLC SERPL-MCNC: 61 MG/DL
HGB BLD-MCNC: 17.2 G/DL (ref 13.3–17.7)
LDLC SERPL CALC-MCNC: 95 MG/DL
MCH RBC QN AUTO: 32.8 PG (ref 26.5–33)
MCHC RBC AUTO-ENTMCNC: 34.3 G/DL (ref 31.5–36.5)
MCV RBC AUTO: 96 FL (ref 78–100)
NONHDLC SERPL-MCNC: 122 MG/DL
PLATELET # BLD AUTO: 169 10E3/UL (ref 150–450)
POTASSIUM SERPL-SCNC: 4.2 MMOL/L (ref 3.4–5.3)
PROT SERPL-MCNC: 7.5 G/DL (ref 6.4–8.3)
PSA SERPL DL<=0.01 NG/ML-MCNC: 0.02 NG/ML (ref 0–4.5)
RBC # BLD AUTO: 5.24 10E6/UL (ref 4.4–5.9)
SODIUM SERPL-SCNC: 137 MMOL/L (ref 136–145)
TRIGL SERPL-MCNC: 133 MG/DL
WBC # BLD AUTO: 5.3 10E3/UL (ref 4–11)

## 2023-08-16 PROCEDURE — 80061 LIPID PANEL: CPT | Performed by: INTERNAL MEDICINE

## 2023-08-16 PROCEDURE — 36415 COLL VENOUS BLD VENIPUNCTURE: CPT | Performed by: INTERNAL MEDICINE

## 2023-08-16 PROCEDURE — 83036 HEMOGLOBIN GLYCOSYLATED A1C: CPT | Performed by: INTERNAL MEDICINE

## 2023-08-16 PROCEDURE — 99396 PREV VISIT EST AGE 40-64: CPT | Performed by: INTERNAL MEDICINE

## 2023-08-16 PROCEDURE — 85027 COMPLETE CBC AUTOMATED: CPT | Performed by: INTERNAL MEDICINE

## 2023-08-16 PROCEDURE — 80053 COMPREHEN METABOLIC PANEL: CPT | Performed by: INTERNAL MEDICINE

## 2023-08-16 PROCEDURE — 84153 ASSAY OF PSA TOTAL: CPT | Performed by: INTERNAL MEDICINE

## 2023-08-16 RX ORDER — OMEGA-3 FATTY ACIDS/FISH OIL 300-1000MG
CAPSULE ORAL
COMMUNITY

## 2023-08-16 ASSESSMENT — ENCOUNTER SYMPTOMS
WEAKNESS: 0
FREQUENCY: 0
DIZZINESS: 0
NAUSEA: 0
HEMATOCHEZIA: 0
SHORTNESS OF BREATH: 0
ABDOMINAL PAIN: 0
DIARRHEA: 0
JOINT SWELLING: 0
CONSTIPATION: 0
COUGH: 0
SORE THROAT: 0
HEARTBURN: 0
FEVER: 0
MYALGIAS: 0
EYE PAIN: 0
DYSURIA: 0
CHILLS: 0
ARTHRALGIAS: 0
PALPITATIONS: 0
NERVOUS/ANXIOUS: 0
HEMATURIA: 0
HEADACHES: 0
PARESTHESIAS: 0

## 2023-08-16 NOTE — PATIENT INSTRUCTIONS
At least 20 minutes of exercise such as walking and stretching every day is recommended.    You need to lose weight.  Goal for at least 20 pound weight loss.  Reduce simple carbohydrates and starchy foods in diet.  Do not drink any calories, such as sweetened coffee drinks, juice or alcohol.    Your colonoscopy is overdue.  Contact [facility] to schedule your colonoscopy.    I would recommend you COVID vaccine this fall.  Flu shot this fall.    You can consider the Shingrix shingles vaccine, which can be obtained at local pharmacy.    See me in 1 year for a welcome to Medicare physical

## 2023-08-16 NOTE — PROGRESS NOTES
SUBJECTIVE:   CC: Quang is an 62 year old who presents for preventative health visit.    Lives independently, walks dog.  Golf once or twice a week.  Does not have a regular exercise routine, however.    No pain issues that affect mobility.  No increasing shortness of breath or chest pain.    He is obese.  Has a family history of diabetes with father.  Had a high blood sugar of 100 last year.    He has not checked his blood pressure.  Blood pressure has been normal without medication.    Excellent cholesterol levels back in 2019 with an LDL of 95 and HDL 61.    No family history of coronary disease    Never smoked.    Prostate cancer 3+3 resected in 2015 without recurrence.  He no longer sees urology.  PSA level was 0.01 last year.    4 polyps in January 2020 colonoscopy.  His bowels are normal.  No blood in stool.  He is overdue for his 3-year colonoscopy.    Chronic left ankle DJD pain from previous basketball injury.    No headache complaints.  No mouth sores or swallowing difficulty.  No new cough.  No palpitations.  No falls or injury.    Saw ophthalmology last winter, no problems.          8/16/2023     9:59 AM   Additional Questions   Roomed by Fatoumata BANUELOS   Accompanied by shayne         8/16/2023     9:59 AM   Patient Reported Additional Medications   Patient reports taking the following new medications no       Healthy Habits:     Getting at least 3 servings of Calcium per day:  Yes    Bi-annual eye exam:  Yes    Dental care twice a year:  Yes    Sleep apnea or symptoms of sleep apnea:  Excessive snoring    Diet:  Regular (no restrictions)    Frequency of exercise:  2-3 days/week    Duration of exercise:  30-45 minutes    Taking medications regularly:  Yes    Medication side effects:  None    Additional concerns today:  No      Today's PHQ-2 Score:       8/16/2023     9:37 AM   PHQ-2 ( 1999 Pfizer)   Q1: Little interest or pleasure in doing things 0   Q2: Feeling down, depressed or hopeless 0   PHQ-2 Score 0   Q1:  Little interest or pleasure in doing things Not at all   Q2: Feeling down, depressed or hopeless Not at all   PHQ-2 Score 0               Social History     Tobacco Use    Smoking status: Never    Smokeless tobacco: Never   Substance Use Topics    Alcohol use: Yes     Alcohol/week: 4.0 - 6.0 standard drinks of alcohol     Comment: Alcoholic Drinks/day: occasional             8/16/2023     9:37 AM   Alcohol Use   Prescreen: >3 drinks/day or >7 drinks/week? No          No data to display                Last PSA:   Prostate Specific Antigen Screen   Date Value Ref Range Status   07/16/2015 4.3 (H) 0.0 - 3.5 ng/mL Final     PSA Tumor Marker   Date Value Ref Range Status   08/15/2022 0.01 0.00 - 4.50 ng/mL Final       Reviewed orders with patient. Reviewed health maintenance and updated orders accordingly - Yes  Current Outpatient Medications   Medication Sig Dispense Refill    ibuprofen (ADVIL,MOTRIN) 200 MG tablet [IBUPROFEN (ADVIL,MOTRIN) 200 MG TABLET] Take 200 mg by mouth every 6 (six) hours as needed for pain.      omega 3 1000 MG CAPS        Allergies   Allergen Reactions    Seasonal Allergies        Reviewed and updated as needed this visit by clinical staff   Tobacco  Allergies  Meds              Reviewed and updated as needed this visit by Provider                 Past Medical History:   Diagnosis Date    Cancer (H)     prostate      Past Surgical History:   Procedure Laterality Date    OTHER SURGICAL HISTORY      oral extractionsin childhood    NE LAP,PROSTATECTOMY,RADICAL,W/NERVE SPARE,INCL ROBOTIC Bilateral 12/22/2015    Procedure: DAVINCI RADICAL RETROPUBIC PROSTATECTOMY BILATERAL PELVIC LYMPH NODE DISSECTION;  Surgeon: Junaid Carcamo MD;  Location: [facility] - [place];  Service: Urology       Review of Systems   Constitutional:  Negative for chills and fever.   HENT:  Negative for congestion, ear pain, hearing loss and sore throat.    Eyes:  Negative for pain and visual disturbance.  "  Respiratory:  Negative for cough and shortness of breath.    Cardiovascular:  Negative for chest pain, palpitations and peripheral edema.   Gastrointestinal:  Negative for abdominal pain, constipation, diarrhea, heartburn, hematochezia and nausea.   Genitourinary:  Positive for impotence. Negative for dysuria, frequency, genital sores, hematuria, penile discharge and urgency.   Musculoskeletal:  Negative for arthralgias, joint swelling and myalgias.   Skin:  Negative for rash.   Neurological:  Negative for dizziness, weakness, headaches and paresthesias.   Psychiatric/Behavioral:  Negative for mood changes. The patient is not nervous/anxious.          OBJECTIVE:   /80   Pulse 59   Temp 98.6  F (37  C)   Resp 18   Ht 1.848 m (6' 0.75\")   Wt 118.8 kg (262 lb)   SpO2 96%   BMI 34.80 kg/m      Physical Exam  Moderately overweight male.  Alert and oriented x3 with normal mood and affect and cognition.  Mobility exam is normal.  Pupils and irises equal and reactive.  Extraocular muscles intact.  No jaundice or conjunctivitis.  External ears and nose exam is normal with normal tympanic membranes and minimal cerumen.  Pharynx does not appear crowded.  Normal pharynx and no oral lesions.  No cervical or supraclavicular or axillary adenopathy.  Teeth in good condition.  No JVD and no carotid bruits.  No thyromegaly or nodularity.  Lungs are clear to auscultation with good respiratory excursion.  Heart is regular with no murmur rub or gallop.  +1 pedal pulse bilaterally.  No significant edema but he does have some moderate varicose veins of his legs bilaterally.  Abdomen is moderately obese, nontender.  Large ventral hernia.  No hepatosplenomegaly, liver edge is right at the costal margin.  No pulsatile abdominal mass.  He declined  exam.  Skin exam without suspicious skin lesions.    ASSESSMENT/PLAN:   (Z00.00) Healthcare maintenance  (primary encounter diagnosis)  Comment: Patient's main health maintenance " issue is obesity and family history of diabetes.    He otherwise appears to be low cardiovascular risk, and has had good cholesterol levels in the past and we will be rechecking them at this time.  If his cholesterol levels are significantly high, we did discuss option for screening cardiac CT scan, but if his cholesterol levels remain well controlled, I would not have him proceed with that.    He declines vaccines today.  I did recommend a COVID and flu shot this fall.  I also discussed the Shingrix vaccine.    Emphasized the importance of daily exercise and 20 minutes of daily exercise was recommended.  20 pound weight loss was recommended with discussion on reduced carbohydrates in diet.  Plan:     (Z85.46) History of prostate cancer  Comment: No recurrence, yearly PSA monitoring  Plan: CBC with platelets, Comprehensive metabolic         panel, PSA tumor marker            (Z86.010) History of colonic polyps  Comment: Overdue for 3-year colonoscopy, referral placed  Plan: Colonoscopy Screening  Referral            (R73.9) Hyperglycemia  Comment: Has gained weight.  At risk for diabetes.  We discussed improvements in diet and exercise  Plan: Hemoglobin A1c            (Z13.220) Screening for hyperlipidemia  Comment:   Plan: Lipid Profile                  COUNSELING:   Reviewed preventive health counseling, as reflected in patient instructions       Regular exercise       Healthy diet/nutrition        He reports that he has never smoked. He has never used smokeless tobacco.            Lino Castro MD  [facility]

## 2023-10-19 ENCOUNTER — PATIENT OUTREACH (OUTPATIENT)
Dept: GASTROENTEROLOGY | Facility: CLINIC | Age: 63
End: 2023-10-19
Payer: COMMERCIAL

## 2024-07-17 ENCOUNTER — PATIENT OUTREACH (OUTPATIENT)
Dept: CARE COORDINATION | Facility: CLINIC | Age: 64
End: 2024-07-17
Payer: COMMERCIAL

## 2024-07-31 ENCOUNTER — PATIENT OUTREACH (OUTPATIENT)
Dept: CARE COORDINATION | Facility: CLINIC | Age: 64
End: 2024-07-31
Payer: COMMERCIAL

## 2024-09-11 ENCOUNTER — TRANSFERRED RECORDS (OUTPATIENT)
Dept: HEALTH INFORMATION MANAGEMENT | Facility: CLINIC | Age: 64
End: 2024-09-11
Payer: COMMERCIAL

## 2024-09-24 ENCOUNTER — OFFICE VISIT (OUTPATIENT)
Dept: INTERNAL MEDICINE | Facility: CLINIC | Age: 64
End: 2024-09-24
Payer: COMMERCIAL

## 2024-09-24 VITALS
HEART RATE: 63 BPM | OXYGEN SATURATION: 97 % | BODY MASS INDEX: 34.54 KG/M2 | RESPIRATION RATE: 18 BRPM | WEIGHT: 255 LBS | TEMPERATURE: 98.6 F | HEIGHT: 72 IN | SYSTOLIC BLOOD PRESSURE: 122 MMHG | DIASTOLIC BLOOD PRESSURE: 82 MMHG

## 2024-09-24 DIAGNOSIS — Z83.3 FAMILY HISTORY OF DIABETES MELLITUS: ICD-10-CM

## 2024-09-24 DIAGNOSIS — Z13.220 SCREENING FOR HYPERLIPIDEMIA: ICD-10-CM

## 2024-09-24 DIAGNOSIS — C61 PROSTATE CANCER (H): ICD-10-CM

## 2024-09-24 DIAGNOSIS — Z00.00 HEALTHCARE MAINTENANCE: Primary | ICD-10-CM

## 2024-09-24 DIAGNOSIS — R73.9 HYPERGLYCEMIA: ICD-10-CM

## 2024-09-24 DIAGNOSIS — Z23 NEED FOR VACCINATION AGAINST STREPTOCOCCUS PNEUMONIAE: ICD-10-CM

## 2024-09-24 DIAGNOSIS — Z86.0100 HISTORY OF COLONIC POLYPS: ICD-10-CM

## 2024-09-24 LAB
ALBUMIN SERPL BCG-MCNC: 4.2 G/DL (ref 3.5–5.2)
ALP SERPL-CCNC: 85 U/L (ref 40–150)
ALT SERPL W P-5'-P-CCNC: 23 U/L (ref 0–70)
ANION GAP SERPL CALCULATED.3IONS-SCNC: 10 MMOL/L (ref 7–15)
AST SERPL W P-5'-P-CCNC: 29 U/L (ref 0–45)
BILIRUB SERPL-MCNC: 0.3 MG/DL
BUN SERPL-MCNC: 16.6 MG/DL (ref 8–23)
CALCIUM SERPL-MCNC: 9.2 MG/DL (ref 8.8–10.4)
CHLORIDE SERPL-SCNC: 106 MMOL/L (ref 98–107)
CHOLEST SERPL-MCNC: 198 MG/DL
CREAT SERPL-MCNC: 1.07 MG/DL (ref 0.67–1.17)
EGFRCR SERPLBLD CKD-EPI 2021: 77 ML/MIN/1.73M2
ERYTHROCYTE [DISTWIDTH] IN BLOOD BY AUTOMATED COUNT: 13 % (ref 10–15)
EST. AVERAGE GLUCOSE BLD GHB EST-MCNC: 108 MG/DL
FASTING STATUS PATIENT QL REPORTED: NO
FASTING STATUS PATIENT QL REPORTED: NO
GLUCOSE SERPL-MCNC: 89 MG/DL (ref 70–99)
HBA1C MFR BLD: 5.4 % (ref 0–5.6)
HCO3 SERPL-SCNC: 22 MMOL/L (ref 22–29)
HCT VFR BLD AUTO: 46.2 % (ref 40–53)
HDLC SERPL-MCNC: 65 MG/DL
HGB BLD-MCNC: 15.7 G/DL (ref 13.3–17.7)
LDLC SERPL CALC-MCNC: 99 MG/DL
MCH RBC QN AUTO: 32.5 PG (ref 26.5–33)
MCHC RBC AUTO-ENTMCNC: 34 G/DL (ref 31.5–36.5)
MCV RBC AUTO: 96 FL (ref 78–100)
NONHDLC SERPL-MCNC: 133 MG/DL
PLATELET # BLD AUTO: 186 10E3/UL (ref 150–450)
POTASSIUM SERPL-SCNC: 4 MMOL/L (ref 3.4–5.3)
PROT SERPL-MCNC: 6.7 G/DL (ref 6.4–8.3)
PSA SERPL DL<=0.01 NG/ML-MCNC: 0.04 NG/ML (ref 0–4.5)
RBC # BLD AUTO: 4.83 10E6/UL (ref 4.4–5.9)
SODIUM SERPL-SCNC: 138 MMOL/L (ref 135–145)
TRIGL SERPL-MCNC: 168 MG/DL
WBC # BLD AUTO: 5.4 10E3/UL (ref 4–11)

## 2024-09-24 PROCEDURE — 85027 COMPLETE CBC AUTOMATED: CPT | Performed by: INTERNAL MEDICINE

## 2024-09-24 PROCEDURE — 83036 HEMOGLOBIN GLYCOSYLATED A1C: CPT | Performed by: INTERNAL MEDICINE

## 2024-09-24 PROCEDURE — 84153 ASSAY OF PSA TOTAL: CPT | Performed by: INTERNAL MEDICINE

## 2024-09-24 PROCEDURE — 90677 PCV20 VACCINE IM: CPT | Performed by: INTERNAL MEDICINE

## 2024-09-24 PROCEDURE — 80061 LIPID PANEL: CPT | Performed by: INTERNAL MEDICINE

## 2024-09-24 PROCEDURE — 90471 IMMUNIZATION ADMIN: CPT | Performed by: INTERNAL MEDICINE

## 2024-09-24 PROCEDURE — 80053 COMPREHEN METABOLIC PANEL: CPT | Performed by: INTERNAL MEDICINE

## 2024-09-24 PROCEDURE — 36415 COLL VENOUS BLD VENIPUNCTURE: CPT | Performed by: INTERNAL MEDICINE

## 2024-09-24 PROCEDURE — 99397 PER PM REEVAL EST PAT 65+ YR: CPT | Mod: 25 | Performed by: INTERNAL MEDICINE

## 2024-09-24 NOTE — PROGRESS NOTES
Preventive Care Visit  [facility]  Lino Castro MD, Internal Medicine  Sep 24, 2024          Quang Barajas   63 year old male    Date of Visit: 9/24/2024    Chief Complaint   Patient presents with    Physical     Subjective  63-year-old male still on regular insurance, not on Medicare yet.  Here for health maintenance physical exam.    Is some moderate abdominal obesity but his hemoglobin A1c level was normal at 5.5% last year.  His father had diabetes.    Patient had excellent cholesterol last year with an LDL 95 and HDL 61 without medication.  No family history of heart disease.  He is never smoked.  He has normal blood pressure without medication.    He feels well with regular activity.  Good exertional ability and no chest pain or increasing shortness of breath.  No palpitations or edema.  No musculoskeletal pain issues that limit activity.    No headache complaints or    No vision changes and sees the eye doctor tomorrow for routine eye exam.    He had prostate cancer 3+3 [place] in 2015 resected without recurrence.  No new urinary symptoms.  His PSA level last year was 0.02.    Just had a colonoscopy earlier this month that showed 3 adenomatous polyps with a 3-year follow-up plan.  No abdominal pain complaints.    Left ankle DJD, chronic old basketball injury is stable.  No other pain complaints.    No cough or respiratory illness symptoms.  He has not wanted to get immunizations previously.    Varicose veins stable without worsening lower extremity edema or leg pain.      PMHx:    Past Medical History:   Diagnosis Date    Cancer (H)     prostate     PSHx:    Past Surgical History:   Procedure Laterality Date    OTHER SURGICAL HISTORY      oral extractionsin childhood    [place] LAP,PROSTATECTOMY,RADICAL,W/NERVE SPARE,INCL ROBOTIC Bilateral 12/22/2015    Procedure: DAVINCI RADICAL RETROPUBIC PROSTATECTOMY BILATERAL PELVIC LYMPH NODE DISSECTION;  Surgeon: Junaid Carcamo MD;   "Location: [facility] OR;  Service: Urology     Immunizations:   Immunization History   Administered Date(s) Administered    COVID-19 Monovalent 18+ (Moderna) 04/10/2021, 05/08/2021, 01/18/2022    Pneumococcal 20 valent Conjugate (Prevnar 20) 09/24/2024    TDAP (Adacel,Boostrix) 07/16/2015    Td (Adult), Adsorbed 12/02/2004    Td,adult,historic,unspecified 12/02/2004       ROS A comprehensive review of systems was performed and was otherwise negative    Medications, allergies, and problem list were reviewed and updated    Exam  /82   Pulse 63   Temp 98.6  F (37  C)   Resp 18   Ht 1.83 m (6' 0.05\")   Wt 115.7 kg (255 lb)   SpO2 97%   BMI 34.54 kg/m    Alert and oriented x 3 with good mood and affect.  Normal cognition.  Normal mobility.  Pupils and irises equal and reactive.  Extraocular muscles intact.  No jaundice or conjunctivitis.  External ears and nose exam is normal.  Minimal cerumen and normal tympanic membranes.  Pharynx appears normal.  Teeth in good condition.  No cervical or supraclavicular or axillary adenopathy.  No JVD and no carotid bruits.  No thyromegaly or nodularity to inspection and palpation.  Lungs clear to auscultation with good respiratory excursion.  Heart is regular with no murmur rub or gallop.  +1 posterior tibialis pulses bilaterally.  Trace ankle edema with varicose veins, no preulcerative calluses.  Abdomen is mildly overweight but nontender.  Large ventral hernia and small periumbilical hernia.  No pulsatile abdominal mass.  Skin exam without suspicious skin lesions.  He declined  exam.    Assessment/Plan  1. Healthcare maintenance  Low cardiovascular risk with excellent cholesterol, never smoked, normal blood pressure and no family history of heart disease.    He does have some moderate obesity and family history of diabetes and his main focus is to avoid developing diabetes.  Reduce simple carbohydrates in diet and continue to lose weight.  He is doing well with " regular exercise.    See patient instructions for health maintenance plan.    He has a routine eye exam scheduled for tomorrow    2. Family history of diabetes mellitus  As above    3. Hyperglycemia  Screening for diabetes as above.  I counseled him on diet.  - Comprehensive metabolic panel  - Hemoglobin A1c    4. Prostate cancer (H)  Resection in 2015 with [place] 3+3 without evidence of recurrence.  - Comprehensive metabolic panel  - PSA tumor marker    5. History of colonic polyps  Multiple polyps, 3-year follow-up colonoscopy due September 2027  - CBC with platelets    6. Screening for hyperlipidemia  Low cardiovascular risk  - Lipid panel reflex to direct LDL Non-fasting    7. Need for vaccination against Streptococcus pneumoniae  Given today.  He declines COVID shot and flu shot.  - PNEUMOCOCCAL 20 VALENT CONJUGATE (PREVNAR 20)      Return in about 1 year (around 9/24/2025) for Health maintenance physical exam.   Patient Instructions   Regular activity is important for maintaining the body's health.  Continue to get 20 minutes of walking or similar activity every day.    Avoid developing diabetes.  Avoid starchy foods and sweets in diet.  Continue to work on weight loss.  Avoid baked goods.    Your next colonoscopy will be due September 2027, for a 3-year follow-up colonoscopy.    See your eye doctor yearly for glaucoma and retina screening, as you are planning.    See me in 1 year for health maintenance physical exam.    Lino Castro MD, MD        Current Outpatient Medications   Medication Sig Dispense Refill    ibuprofen (ADVIL,MOTRIN) 200 MG tablet [IBUPROFEN (ADVIL,MOTRIN) 200 MG TABLET] Take 200 mg by mouth every 6 (six) hours as needed for pain.      omega 3 1000 MG CAPS        Allergies   Allergen Reactions    Seasonal Allergies      Social History     Tobacco Use    Smoking status: Never     Passive exposure: Never    Smokeless tobacco: Never   Vaping Use    Vaping status: Never Used   Substance Use  Topics    Alcohol use: Yes     Alcohol/week: 4.0 - 6.0 standard drinks of alcohol     Comment: Alcoholic Drinks/day: occasional    Drug use: No         Okay    Subjective   Quang is a 63 year old, presenting for the following:  Physical        9/24/2024     2:10 PM   Additional Questions   Roomed by Fatoumata BANUELOS   Accompanied by shayne        Health Care Directive  Patient does not have a Health Care Directive or Living Will: Discussed advance care planning with patient; information given to patient to review.    HPI              9/24/2024   General Health   How would you rate your overall physical health? Good   Feel stress (tense, anxious, or unable to sleep) Not at all            9/24/2024   Nutrition   Diet: Regular (no restrictions)            9/24/2024   Exercise   Days per week of moderate/strenous exercise 6 days            9/24/2024   Social Factors   Frequency of gathering with friends or relatives Three times a week   Worry food won't last until get money to buy more No   Food not last or not have enough money for food? No   Do you have housing? (Housing is defined as stable permanent housing and does not include staying ouside in a car, in a tent, in an abandoned building, in an overnight shelter, or couch-surfing.) Yes   Are you worried about losing your housing? No   Lack of transportation? No   Unable to get utilities (heat,electricity)? No            9/24/2024   Fall Risk   Fallen 2 or more times in the past year? No    No   Trouble with walking or balance? No    No       Multiple values from one day are sorted in reverse-chronological order          9/24/2024   Activities of Daily Living- Home Safety   Needs help with the following daily activites None of the above   Safety concerns in the home None of the above            9/24/2024   Dental   Dentist two times every year? Yes            9/24/2024   Hearing Screening   Hearing concerns? None of the above            9/24/2024   Driving Risk Screening    Patient/family members have concerns about driving No            9/24/2024   General Alertness/Fatigue Screening   Have you been more tired than usual lately? No            9/24/2024   Urinary Incontinence Screening   Bothered by leaking urine in past 6 months No            9/24/2024   TB Screening   Were you born outside of the [place]? No            Today's PHQ-2 Score:       9/24/2024     1:40 PM   PHQ-2 ( 1999 Pf[employer])   Q1: Little interest or pleasure in doing things 0   Q2: Feeling down, depressed or hopeless 0   PHQ-2 Score 0   Q1: Little interest or pleasure in doing things Not at all   Q2: Feeling down, depressed or hopeless Not at all   PHQ-2 Score 0           9/24/2024   Substance Use   Alcohol more than 3/day or more than 7/wk No   Do you have a current opioid prescription? No   How severe/bad is pain from 1 to 10? 0/10 (No Pain)   Do you use any other substances recreationally? No        Social History     Tobacco Use    Smoking status: Never     Passive exposure: Never    Smokeless tobacco: Never   Vaping Use    Vaping status: Never Used   Substance Use Topics    Alcohol use: Yes     Alcohol/week: 4.0 - 6.0 standard drinks of alcohol     Comment: Alcoholic Drinks/day: occasional    Drug use: No           9/24/2024   AAA Screening   Family history of Abdominal Aortic Aneurysm (AAA)? No      Last PSA:   Prostate Specific Antigen Screen   Date Value Ref Range Status   07/16/2015 4.3 (H) 0.0 - 3.5 ng/mL Final     PSA Tumor Marker   Date Value Ref Range Status   08/16/2023 0.02 0.00 - 4.50 ng/mL Final     ASCVD Risk   The 10-year ASCVD risk score (Jenaro BAEZA, et al., 2019) is: 9.9%    Values used to calculate the score:      Age: 63 years      Sex: Male      Is Non- : No      Diabetic: No      Tobacco smoker: No      Systolic Blood Pressure: 122 mmHg      Is BP treated: No      HDL Cholesterol: 61 mg/dL      Total Cholesterol: 183 mg/dL            Reviewed and updated as needed  "this visit by Provider                      Current providers sharing in care for this patient include:  Patient Care Team:  Lino Castro MD as PCP - General (Internal Medicine)  Lino Castro MD as Assigned PCP    The following health maintenance items are reviewed in Epic and correct as of today:  Health Maintenance   Topic Date Due    ANNUAL REVIEW OF HM ORDERS  Never done    ZOSTER IMMUNIZATION (1 of 2) Never done    DTAP/TDAP/TD IMMUNIZATION (1 - Tdap) 07/16/2015    Pneumococcal Vaccine: 65+ Years (1 of 1 - PCV) Never done    MEDICARE ANNUAL WELLNESS VISIT  08/16/2024    INFLUENZA VACCINE (1) Never done    COVID-19 Vaccine (4 - 2024-25 season) 09/01/2024    FALL RISK ASSESSMENT  09/24/2025    GLUCOSE  08/16/2026    ADVANCE CARE PLANNING  08/15/2027    COLORECTAL CANCER SCREENING  09/11/2027    LIPID  08/16/2028    RSV VACCINE (1 - 1-dose 75+ series) 11/06/2033    HEPATITIS C SCREENING  Completed    HIV SCREENING  Completed    PHQ-2 (once per calendar year)  Completed    HPV IMMUNIZATION  Aged Out    MENINGITIS IMMUNIZATION  Aged Out    RSV MONOCLONAL ANTIBODY  Aged Out            Objective    Exam  /82   Pulse 63   Temp 98.6  F (37  C)   Resp 18   Ht 1.83 m (6' 0.05\")   Wt 115.7 kg (255 lb)   SpO2 97%   BMI 34.54 kg/m     Estimated body mass index is 34.54 kg/m  as calculated from the following:    Height as of this encounter: 1.83 m (6' 0.05\").    Weight as of this encounter: 115.7 kg (255 lb).    Physical Exam          9/24/2024   Mini Cog   Clock Draw Score 2 Normal   3 Item Recall 2 objects recalled   Mini Cog Total Score 4                Signed Electronically by: Lino Castro MD    "

## 2024-09-24 NOTE — PATIENT INSTRUCTIONS
Regular activity is important for maintaining the body's health.  Continue to get 20 minutes of walking or similar activity every day.    Avoid developing diabetes.  Avoid starchy foods and sweets in diet.  Continue to work on weight loss.  Avoid baked goods.    Your next colonoscopy will be due September 2027, for a 3-year follow-up colonoscopy.    See your eye doctor yearly for glaucoma and retina screening, as you are planning.    See me in 1 year for health maintenance physical exam.  
Breathing spontaneous and unlabored. Breath sounds clear and equal bilaterally with regular rhythm.

## 2025-02-24 ENCOUNTER — OFFICE VISIT (OUTPATIENT)
Dept: INTERNAL MEDICINE | Facility: CLINIC | Age: 65
End: 2025-02-24
Payer: COMMERCIAL

## 2025-02-24 VITALS
HEIGHT: 72 IN | DIASTOLIC BLOOD PRESSURE: 80 MMHG | HEART RATE: 71 BPM | OXYGEN SATURATION: 95 % | TEMPERATURE: 98.5 F | SYSTOLIC BLOOD PRESSURE: 130 MMHG | RESPIRATION RATE: 18 BRPM | BODY MASS INDEX: 34.58 KG/M2

## 2025-02-24 DIAGNOSIS — D23.39: Primary | ICD-10-CM

## 2025-02-24 PROCEDURE — G2211 COMPLEX E/M VISIT ADD ON: HCPCS | Performed by: INTERNAL MEDICINE

## 2025-02-24 PROCEDURE — 99213 OFFICE O/P EST LOW 20 MIN: CPT | Performed by: INTERNAL MEDICINE

## 2025-02-24 NOTE — PATIENT INSTRUCTIONS
This is a benign skin lesion on your right cheek below your eye.  If you wish to have it removed for cosmetic reasons, you can see a dermatologist.    Follow-up for your wellness visit September 25

## 2025-02-24 NOTE — PROGRESS NOTES
Quang Barajas   64 year old male    Date of Visit: 2/24/2025    Chief Complaint   Patient presents with    Skin Spots     Darker spot on face x 2 months.     Subjective  Otherwise healthy 64-year-old male.  No history of skin cancer.  Has developed a mole underneath his right eye over the past 2-3 months.  Is not painful.  He is otherwise feeling well    It was not noted on his September 2024 exam    PMHx:    Past Medical History:   Diagnosis Date    Cancer (H)     prostate     PSHx:    Past Surgical History:   Procedure Laterality Date    OTHER SURGICAL HISTORY      oral extractionsin childhood    [place] LAP,PROSTATECTOMY,RADICAL,W/NERVE SPARE,INCL ROBOTIC Bilateral 12/22/2015    Procedure: DAVINCI RADICAL RETROPUBIC PROSTATECTOMY BILATERAL PELVIC LYMPH NODE DISSECTION;  Surgeon: Junaid Carcamo MD;  [place] [facility] [place]  Service: Urology     Immunizations:   Immunization History   Administered Date(s) Administered    COVID-19 Monovalent 18+ (Moderna) 04/10/2021, 05/08/2021, 01/18/2022    Pneumococcal 20 valent Conjugate (Prevnar 20) 09/24/2024    TDAP (Adacel,Boostrix) 07/16/2015    Td (Adult), Adsorbed 12/02/2004    Td,adult,historic,unspecified 12/02/2004       ROS A comprehensive review of systems was performed and was otherwise negative    Medications, allergies, and problem list were reviewed and updated    Exam  /80   Pulse 71   Temp 98.5  F (36.9  C)   Resp 18   Ht 1.829 m (6')   SpO2 95%   BMI 34.58 kg/m    Is a benign 0.5 cm pedunculated dermal nevus with at least 3 hairs growing out of it.  It is well-demarcated, uniform color and uniform morphology.  It is not tender or erythematous.  There is no deeper fluctuance or mass.  There is just a slight very small seborrheic keratosis just below the dermal nevus.  There is a sotelo on the right upper eyelid.  There are number of other benign-appearing skin lesions.    Assessment/Plan  1. Dermal nevus of cheek  (Primary)  Benign-appearing skin lesion under right eye.  Patient did not want dermatology removal for cosmetic reasons.    Routine follow-up for physical exam this fall    The longitudinal plan of care for the diagnosis(es)/condition(s) as documented were addressed during this visit. Due to the added complexity in care, I will continue to support Quang in the subsequent management and with ongoing continuity of care.        Return in 7 months (on 9/25/2025) for Annual wellness visit.   Patient Instructions   This is a benign skin lesion on your right cheek below your eye.  If you wish to have it removed for cosmetic reasons, you can see a dermatologist.    Follow-up for your wellness visit September 25    Lino Castro MD, MD        Current Outpatient Medications   Medication Sig Dispense Refill    ibuprofen (ADVIL,MOTRIN) 200 MG tablet [IBUPROFEN (ADVIL,MOTRIN) 200 MG TABLET] Take 200 mg by mouth every 6 (six) hours as needed for pain.      omega 3 1000 MG CAPS        Allergies   Allergen Reactions    Seasonal Allergies      Social History     Tobacco Use    Smoking status: Never     Passive exposure: Never    Smokeless tobacco: Never   Vaping Use    Vaping status: Never Used   Substance Use Topics    Alcohol use: Yes     Alcohol/week: 4.0 - 6.0 standard drinks of alcohol     Comment: Alcoholic Drinks/day: occasional    Drug use: No             Subjective   Quang is a 64 year old, presenting for the following health issues:  Skin Spots (Darker spot on face x 2 months.)        2/24/2025     2:33 PM   Additional Questions   Roomed by Fatoumata BANUELOS   Accompanied by shayne     History of Present Illness       Reason for visit:  Slight discolor of spot on face   He is taking medications regularly.                     Objective    /80   Pulse 71   Temp 98.5  F (36.9  C)   Resp 18   Ht 1.829 m (6')   SpO2 95%   BMI 34.58 kg/m    Body mass index is 34.58 kg/m .  Physical Exam               Signed Electronically by: Lino ADKINS  MD Matthew

## 2025-08-12 ENCOUNTER — OFFICE VISIT (OUTPATIENT)
Dept: INTERNAL MEDICINE | Facility: CLINIC | Age: 65
End: 2025-08-12
Payer: COMMERCIAL

## 2025-08-12 VITALS
WEIGHT: 266 LBS | RESPIRATION RATE: 16 BRPM | TEMPERATURE: 98 F | HEART RATE: 64 BPM | HEIGHT: 72 IN | SYSTOLIC BLOOD PRESSURE: 120 MMHG | DIASTOLIC BLOOD PRESSURE: 78 MMHG | OXYGEN SATURATION: 96 % | BODY MASS INDEX: 36.03 KG/M2

## 2025-08-12 DIAGNOSIS — M25.531 PAIN IN JOINT INVOLVING FOREARM, RIGHT: Primary | ICD-10-CM

## 2025-08-12 PROCEDURE — 3074F SYST BP LT 130 MM HG: CPT | Performed by: INTERNAL MEDICINE

## 2025-08-12 PROCEDURE — 99214 OFFICE O/P EST MOD 30 MIN: CPT | Performed by: INTERNAL MEDICINE

## 2025-08-12 PROCEDURE — 3078F DIAST BP <80 MM HG: CPT | Performed by: INTERNAL MEDICINE

## 2025-08-12 PROCEDURE — G2211 COMPLEX E/M VISIT ADD ON: HCPCS | Performed by: INTERNAL MEDICINE

## 2025-08-13 ENCOUNTER — HOSPITAL ENCOUNTER (OUTPATIENT)
Dept: MRI IMAGING | Facility: HOSPITAL | Age: 65
Discharge: HOME | End: 2025-08-13
Attending: INTERNAL MEDICINE
Payer: COMMERCIAL

## 2025-08-13 DIAGNOSIS — M25.531 PAIN IN JOINT INVOLVING FOREARM, RIGHT: ICD-10-CM

## 2025-08-13 PROCEDURE — 73221 MRI JOINT UPR EXTREM W/O DYE: CPT | Mod: RT
